# Patient Record
Sex: MALE | Race: WHITE
[De-identification: names, ages, dates, MRNs, and addresses within clinical notes are randomized per-mention and may not be internally consistent; named-entity substitution may affect disease eponyms.]

---

## 2017-05-30 ENCOUNTER — APPOINTMENT (OUTPATIENT)
Dept: DERMATOLOGY | Facility: CLINIC | Age: 22
End: 2017-05-30

## 2017-05-30 VITALS — BODY MASS INDEX: 21.22 KG/M2 | HEIGHT: 68 IN | WEIGHT: 140 LBS

## 2017-05-30 DIAGNOSIS — Z78.9 OTHER SPECIFIED HEALTH STATUS: ICD-10-CM

## 2017-05-30 PROBLEM — Z00.00 ENCOUNTER FOR PREVENTIVE HEALTH EXAMINATION: Status: ACTIVE | Noted: 2017-05-30

## 2017-07-18 ENCOUNTER — APPOINTMENT (OUTPATIENT)
Dept: DERMATOLOGY | Facility: CLINIC | Age: 22
End: 2017-07-18

## 2017-07-18 VITALS — BODY MASS INDEX: 21.22 KG/M2 | HEIGHT: 68 IN | WEIGHT: 140 LBS

## 2017-07-28 ENCOUNTER — APPOINTMENT (OUTPATIENT)
Dept: DERMATOLOGY | Facility: CLINIC | Age: 22
End: 2017-07-28
Payer: COMMERCIAL

## 2017-07-28 DIAGNOSIS — L02.11 CUTANEOUS ABSCESS OF NECK: ICD-10-CM

## 2017-07-28 DIAGNOSIS — B08.1 MOLLUSCUM CONTAGIOSUM: ICD-10-CM

## 2017-07-28 PROCEDURE — 99213 OFFICE O/P EST LOW 20 MIN: CPT | Mod: 25

## 2017-07-28 PROCEDURE — 10060 I&D ABSCESS SIMPLE/SINGLE: CPT

## 2017-07-31 LAB — BACTERIA SPEC CULT: ABNORMAL

## 2017-10-19 ENCOUNTER — APPOINTMENT (OUTPATIENT)
Dept: DERMATOLOGY | Facility: CLINIC | Age: 22
End: 2017-10-19

## 2019-03-26 ENCOUNTER — APPOINTMENT (OUTPATIENT)
Dept: DERMATOLOGY | Facility: CLINIC | Age: 24
End: 2019-03-26

## 2019-12-23 ENCOUNTER — APPOINTMENT (OUTPATIENT)
Dept: DERMATOLOGY | Facility: CLINIC | Age: 24
End: 2019-12-23
Payer: COMMERCIAL

## 2019-12-23 DIAGNOSIS — L73.9 FOLLICULAR DISORDER, UNSPECIFIED: ICD-10-CM

## 2019-12-23 PROCEDURE — 99214 OFFICE O/P EST MOD 30 MIN: CPT

## 2019-12-23 NOTE — PHYSICAL EXAM
[Full Body Skin Exam Performed] : performed [FreeTextEntry3] : Skin examination performed of the face, neck, trunk, arms, legs; \par The patient is well, alert and oriented, pleasant and cooperative.\par Eyelids, conjunctivae, oral mucosa, digits and nails all normal.  \par No cervical adenopathy.\par \par Normal findings include:\par \par regular brown papule;  R upper posterior arm\par acneiform papules in beard area;  L > R cheek, chin\par \par Multiple benign nevi were noted. \par \par No lesions were suspicious for malignancy. \par \par

## 2019-12-23 NOTE — HISTORY OF PRESENT ILLNESS
[de-identified] : Pt. presents for skin check;\par No itching, bleeding, growing, changing lesions noted;\par Severity:  mild  \par Modifying factors:  none\par Associated symptoms:  none\par Context:  no association with activity \par Hx molluscum on neck;  in 2017;  now c/o problems with bumps on beard area; \par also:  mole on R arm;  lifelong;  ? changed recently\par

## 2019-12-23 NOTE — ASSESSMENT
[FreeTextEntry1] : Benign congenital nevus R upper arm;  no suspicious features; \par \par Complete skin examination is negative for malignancy; Pt. goes tanning, discussed at length;  needs annual exams; \par \par shaving folliculitis;  discussed electric razor;  duac gel HS:\par \par Hx molluscum;  resolved

## 2020-08-08 ENCOUNTER — APPOINTMENT (OUTPATIENT)
Dept: DERMATOLOGY | Facility: CLINIC | Age: 25
End: 2020-08-08

## 2020-12-23 ENCOUNTER — APPOINTMENT (OUTPATIENT)
Dept: DERMATOLOGY | Facility: CLINIC | Age: 25
End: 2020-12-23

## 2021-03-23 ENCOUNTER — TRANSCRIPTION ENCOUNTER (OUTPATIENT)
Age: 26
End: 2021-03-23

## 2021-03-27 ENCOUNTER — TRANSCRIPTION ENCOUNTER (OUTPATIENT)
Age: 26
End: 2021-03-27

## 2023-02-03 ENCOUNTER — NON-APPOINTMENT (OUTPATIENT)
Age: 28
End: 2023-02-03

## 2023-03-01 ENCOUNTER — LABORATORY RESULT (OUTPATIENT)
Age: 28
End: 2023-03-01

## 2023-03-01 ENCOUNTER — APPOINTMENT (OUTPATIENT)
Dept: TRANSGENDER CARE | Facility: CLINIC | Age: 28
End: 2023-03-01
Payer: COMMERCIAL

## 2023-03-01 ENCOUNTER — TRANSCRIPTION ENCOUNTER (OUTPATIENT)
Age: 28
End: 2023-03-01

## 2023-03-01 VITALS
SYSTOLIC BLOOD PRESSURE: 152 MMHG | HEART RATE: 78 BPM | TEMPERATURE: 98.7 F | WEIGHT: 178 LBS | DIASTOLIC BLOOD PRESSURE: 98 MMHG | OXYGEN SATURATION: 97 % | BODY MASS INDEX: 26.98 KG/M2 | HEIGHT: 68 IN

## 2023-03-01 DIAGNOSIS — Z11.3 ENCOUNTER FOR SCREENING FOR INFECTIONS WITH A PREDOMINANTLY SEXUAL MODE OF TRANSMISSION: ICD-10-CM

## 2023-03-01 DIAGNOSIS — Z11.59 ENCOUNTER FOR SCREENING FOR OTHER VIRAL DISEASES: ICD-10-CM

## 2023-03-01 DIAGNOSIS — Z11.4 ENCOUNTER FOR SCREENING FOR HUMAN IMMUNODEFICIENCY VIRUS [HIV]: ICD-10-CM

## 2023-03-01 PROCEDURE — 99214 OFFICE O/P EST MOD 30 MIN: CPT | Mod: 25

## 2023-03-01 RX ORDER — IMIQUIMOD 50 MG/G
5 CREAM TOPICAL
Qty: 1 | Refills: 2 | Status: COMPLETED | COMMUNITY
Start: 2017-05-30 | End: 2023-03-01

## 2023-03-01 RX ORDER — CLINDAMYCIN PHOSPHATE AND BENZOYL PEROXIDE 10; 50 MG/G; MG/G
1.2-5 GEL TOPICAL
Qty: 1 | Refills: 3 | Status: COMPLETED | COMMUNITY
Start: 2019-12-23 | End: 2023-03-01

## 2023-03-01 RX ORDER — CEFADROXIL 500 MG/1
500 CAPSULE ORAL TWICE DAILY
Qty: 20 | Refills: 0 | Status: COMPLETED | COMMUNITY
Start: 2017-07-28 | End: 2023-03-01

## 2023-03-01 NOTE — PHYSICAL EXAM
[Normal Sclera/Conjunctiva] : normal sclera/conjunctiva [Normal TMs] : both tympanic membranes were normal [No Edema] : there was no peripheral edema [No Extremity Clubbing/Cyanosis] : no extremity clubbing/cyanosis [Soft] : abdomen soft [Non Tender] : non-tender [Non-distended] : non-distended [No Masses] : no abdominal mass palpated [Normal Bowel Sounds] : normal bowel sounds [Normal] : no rash [Normal Gait] : normal gait

## 2023-03-03 ENCOUNTER — TRANSCRIPTION ENCOUNTER (OUTPATIENT)
Age: 28
End: 2023-03-03

## 2023-03-03 LAB
25(OH)D3 SERPL-MCNC: 29.9 NG/ML
ALBUMIN SERPL ELPH-MCNC: 5.3 G/DL
ALP BLD-CCNC: 67 U/L
ALT SERPL-CCNC: 27 U/L
ANION GAP SERPL CALC-SCNC: 13 MMOL/L
APPEARANCE: CLEAR
AST SERPL-CCNC: 22 U/L
BASOPHILS # BLD AUTO: 0.02 K/UL
BASOPHILS NFR BLD AUTO: 0.4 %
BILIRUB SERPL-MCNC: 0.4 MG/DL
BILIRUBIN URINE: NEGATIVE
BLOOD URINE: NEGATIVE
BUN SERPL-MCNC: 14 MG/DL
C TRACH RRNA SPEC QL NAA+PROBE: NOT DETECTED
CALCIUM SERPL-MCNC: 10.4 MG/DL
CHLORIDE SERPL-SCNC: 103 MMOL/L
CHOLEST SERPL-MCNC: 222 MG/DL
CO2 SERPL-SCNC: 26 MMOL/L
COLOR: YELLOW
CREAT SERPL-MCNC: 0.96 MG/DL
EGFR: 112 ML/MIN/1.73M2
EOSINOPHIL # BLD AUTO: 0.04 K/UL
EOSINOPHIL NFR BLD AUTO: 0.8 %
ESTIMATED AVERAGE GLUCOSE: 91 MG/DL
ESTRADIOL SERPL-MCNC: 25 PG/ML
FSH SERPL-MCNC: 4 IU/L
GLUCOSE QUALITATIVE U: NEGATIVE
GLUCOSE SERPL-MCNC: 100 MG/DL
HBA1C MFR BLD HPLC: 4.8 %
HBV CORE IGG+IGM SER QL: NONREACTIVE
HBV SURFACE AB SERPL IA-ACNC: 85 MIU/ML
HBV SURFACE AG SER QL: NONREACTIVE
HCT VFR BLD CALC: 51.2 %
HCV AB SER QL: NONREACTIVE
HCV S/CO RATIO: 0.09 S/CO
HDLC SERPL-MCNC: 60 MG/DL
HEPATITIS A IGG ANTIBODY: NONREACTIVE
HGB BLD-MCNC: 17.2 G/DL
HIV1 RNA # SERPL NAA+PROBE: NORMAL
HIV1 RNA # SERPL NAA+PROBE: NORMAL COPIES/ML
HIV1+2 AB SPEC QL IA.RAPID: NONREACTIVE
IMM GRANULOCYTES NFR BLD AUTO: 0.2 %
KETONES URINE: NEGATIVE
LDLC SERPL CALC-MCNC: 130 MG/DL
LEUKOCYTE ESTERASE URINE: NEGATIVE
LH SERPL-ACNC: 3.4 IU/L
LYMPHOCYTES # BLD AUTO: 1.39 K/UL
LYMPHOCYTES NFR BLD AUTO: 28.1 %
MAN DIFF?: NORMAL
MCHC RBC-ENTMCNC: 31 PG
MCHC RBC-ENTMCNC: 33.6 GM/DL
MCV RBC AUTO: 92.4 FL
MONOCYTES # BLD AUTO: 0.33 K/UL
MONOCYTES NFR BLD AUTO: 6.7 %
N GONORRHOEA RRNA SPEC QL NAA+PROBE: NOT DETECTED
NEUTROPHILS # BLD AUTO: 3.15 K/UL
NEUTROPHILS NFR BLD AUTO: 63.8 %
NITRITE URINE: NEGATIVE
NONHDLC SERPL-MCNC: 161 MG/DL
PH URINE: 7
PLATELET # BLD AUTO: 249 K/UL
POTASSIUM SERPL-SCNC: 4.7 MMOL/L
PROT SERPL-MCNC: 8.2 G/DL
PROTEIN URINE: ABNORMAL
RBC # BLD: 5.54 M/UL
RBC # FLD: 13.1 %
SODIUM SERPL-SCNC: 142 MMOL/L
SOURCE AMPLIFICATION: NORMAL
SOURCE ANAL: NORMAL
SOURCE ORAL: NORMAL
SPECIFIC GRAVITY URINE: 1.03
T PALLIDUM AB SER QL IA: NEGATIVE
TESTOST SERPL-MCNC: 300 NG/DL
TRIGL SERPL-MCNC: 156 MG/DL
TSH SERPL-ACNC: 2.05 UIU/ML
UROBILINOGEN URINE: NORMAL
VIRAL LOAD INTERP: NORMAL
VIRAL LOAD LOG: NORMAL LG COP/ML
WBC # FLD AUTO: 4.94 K/UL

## 2023-03-08 ENCOUNTER — APPOINTMENT (OUTPATIENT)
Dept: TRANSGENDER CARE | Facility: CLINIC | Age: 28
End: 2023-03-08
Payer: COMMERCIAL

## 2023-03-08 VITALS
TEMPERATURE: 98.5 F | HEART RATE: 76 BPM | SYSTOLIC BLOOD PRESSURE: 138 MMHG | HEIGHT: 68 IN | BODY MASS INDEX: 27.43 KG/M2 | DIASTOLIC BLOOD PRESSURE: 90 MMHG | OXYGEN SATURATION: 97 % | WEIGHT: 181 LBS

## 2023-03-08 DIAGNOSIS — E78.00 PURE HYPERCHOLESTEROLEMIA, UNSPECIFIED: ICD-10-CM

## 2023-03-08 PROCEDURE — 99214 OFFICE O/P EST MOD 30 MIN: CPT

## 2023-03-08 NOTE — HISTORY OF PRESENT ILLNESS
[FreeTextEntry1] : f/u [de-identified] : Patient reports doing well since last visit, was recently cleared by MH provider. Patient reports he did not find sperm banking resources in his folder, but would like to pursue this prior to starting hormones. He reports most interested in starting estradiol tabs and navi.\par \par Patient reports he is ready to start hormones, but just concerned with being in school full time etc. does not want to take on too much at once. Reports this has been a long time coming and very ready to start. He has consult with Dr. Jolly later this month, patient looking to start laser hair removal.\par \par He reports at last visit he was dehydrated and had not drank much. Patient reports he was fasting for 18 hours prior to labs. He reports that his father has elevated cholesterol, runs in the family. He received message on [facility] and is attempting to make lifestyle modifications.\par \par Pt denies any SOB, cough, chest pain, palpitations, N/V/D/C, fever, or chills. No other health concerns today.

## 2023-03-08 NOTE — PHYSICAL EXAM
[No Respiratory Distress] : no respiratory distress  [Normal Gait] : normal gait [Normal] : affect was normal and insight and judgment were intact

## 2023-03-08 NOTE — PLAN
[FreeTextEntry1] : \par Patient dehydrated at time of visit, repeat CBC at next blood draw once hydrated. Patient verbalized understanding. \par \par Elevated cholesterol: No treatment required at this time - recommend dietary modifications and continue to monitor. Advised patient to decrease sugars/carbs/fat intake specifically saturated and trans fats (fried food, fast food, cheeses, processed foods). Educated to increase polyunsaturated and monounsaturated fats (olive oil, fish, avocado, Mediterranean diet foods) to help increase HDL. Advised patient that weight loss (diet and exercise) can help impact lipid levels positively. Patient verbalized understanding and has no further questions. \par \par Gender Dysphoria: long discussion with patient, gave resources for sperm banking. Patient will call after sperm banking and I will send in rx for hormones to pharmacy on chart to start GAHT - will call if he changes his mind on sperm banking. Patient advised can also start hormones and stop if chooses to do so. F/u 3 months after starting GAHT. Advised patient to call with any questions or concerns. Patient verbalized understanding. \par \par \par \par Patient wishes to start gender-affirming Feminization therapy.  \par \par Discussed with patient that the feminizing changes of Estrogen and Spironolactone may take several months to become noticeable and usually take 3 to 5 years to reach full effect.  \par \par Discussed permanent changes which include breast growth and development, testicular atrophy, and infertility.\par \par Discussed non-permanent changes including loss of muscle mass, decreased muscle strength, weight gain with redistribution of fat to buttocks and thighs, facial/body hair softening and lightening (current facial and body hair will not go away), male pattern baldness may slow or stop, but not go away, reduced sex drive, decreased strength or erections or cessation of erections, decrease in volume and viscosity of ejaculate, changes in mood including increased emotional responses and tearfulness.\par \par Discussed risks and possible side effects, including loss of fertility, increased urinary frequency, possible increased risk of kidney damage, increase risk of blood clots, strokes, and heart attacks, possible increase in cholesterol and blood pressure leading to increased risk for cardiovascular disease, possible increase in risk of developing diabetes, increased appetite and weight gain, possible liver damage, possible worsening of headaches or migraines, galactorrhea and increased prolactin levels, increased risk of prolactinomas, possible worsening of depression or mood swings, and possible increased risk of breast cancer.\par \par Patient understands that smoking cigarettes may increase some of the risks of taking estrogen, and that taking doses higher than recommended will increase the risks of feminizing treatment. Patient understands that feminizing hormone therapy is expected to be lifelong and that suddenly stopping it after a long time may have negative health effects. Patient understands they may choose to stop therapy at any time and for any reason, but they are encouraged to discuss this decision with a healthcare provider prior to doing this. Patient understands that some medical reasons and/or safety concerns may require decrease in dosage or cessation of therapy at providers discretion.  \par \par Cryopreservation/fertility options were discussed with patient.\par \par Patient voices understanding of our discussion today. Questions and concerns have been addressed. Patient consents to initiation of treatment. \par \par \par Baseline labs reviewed.  \par \par Start Estradiol 2 mg bid and Spironolactone 50 mg bid, with goal estradiol level 100-200 and goal testosterone level <55  \par \par Repeat levels in 3 months, and titrate dose as appropriate.\par \par Patient will need age appropriate prostate cancer screening and will need age appropriate screening mammogram after 5 years of therapy. \par \par \par \par \par \par Advised patient to call with any questions or concerns. Patient verbalized understanding.

## 2023-03-10 ENCOUNTER — TRANSCRIPTION ENCOUNTER (OUTPATIENT)
Age: 28
End: 2023-03-10

## 2023-03-12 ENCOUNTER — TRANSCRIPTION ENCOUNTER (OUTPATIENT)
Age: 28
End: 2023-03-12

## 2023-03-14 ENCOUNTER — TRANSCRIPTION ENCOUNTER (OUTPATIENT)
Age: 28
End: 2023-03-14

## 2023-03-14 ENCOUNTER — NON-APPOINTMENT (OUTPATIENT)
Age: 28
End: 2023-03-14

## 2023-03-20 ENCOUNTER — APPOINTMENT (OUTPATIENT)
Dept: OTOLARYNGOLOGY | Facility: CLINIC | Age: 28
End: 2023-03-20

## 2023-03-23 ENCOUNTER — APPOINTMENT (OUTPATIENT)
Dept: PLASTIC SURGERY | Facility: CLINIC | Age: 28
End: 2023-03-23
Payer: COMMERCIAL

## 2023-03-23 DIAGNOSIS — F64.9 GENDER IDENTITY DISORDER, UNSPECIFIED: ICD-10-CM

## 2023-03-23 PROCEDURE — 99203 OFFICE O/P NEW LOW 30 MIN: CPT

## 2023-03-25 PROBLEM — F64.9 GENDER DYSPHORIA: Status: ACTIVE | Noted: 2023-03-01

## 2023-03-25 NOTE — HISTORY OF PRESENT ILLNESS
[FreeTextEntry1] : 27 years old patient presents in the office for a hair laser removal of face, chest, abdomen and back consultation\sanjuana Lea is a transgender male to female patient in the early stages of transitioning who has been diagnosed with gender dysphoria.  The patient reports that the body and facial hair but most specifically the facial and neck hair cause and exacerbate feelings of gender dysphoria.  The patient reports shaving twice daily due to rapid hair growth and feelings of dysphoria.  \par Patient denies previous gender affirming surgeries and procedures\par No contraindicating medical history to laser treatment\par No significant past medical or surgical history\par No pertinent family medical history

## 2023-04-06 ENCOUNTER — APPOINTMENT (OUTPATIENT)
Dept: DERMATOLOGY | Facility: CLINIC | Age: 28
End: 2023-04-06
Payer: COMMERCIAL

## 2023-04-06 DIAGNOSIS — L81.4 OTHER MELANIN HYPERPIGMENTATION: ICD-10-CM

## 2023-04-06 DIAGNOSIS — D22.61 MELANOCYTIC NEVI OF RIGHT UPPER LIMB, INCLUDING SHOULDER: ICD-10-CM

## 2023-04-06 DIAGNOSIS — L72.3 SEBACEOUS CYST: ICD-10-CM

## 2023-04-06 PROCEDURE — 10060 I&D ABSCESS SIMPLE/SINGLE: CPT

## 2023-04-06 PROCEDURE — 99203 OFFICE O/P NEW LOW 30 MIN: CPT | Mod: 25

## 2023-04-06 NOTE — HISTORY OF PRESENT ILLNESS
[de-identified] : Pt. c/o lesion on left cheek;  aggravated by shaving, grew recently;\par also:  hx tanning bed use;  c/o mole on Right arm

## 2023-04-06 NOTE — PHYSICAL EXAM
[FreeTextEntry3] : Left lateral cheek;  inflamed double comedone; \par \par R posterior upper arm; \par regular brown nevus with terminal hair

## 2023-04-13 ENCOUNTER — APPOINTMENT (OUTPATIENT)
Dept: PLASTIC SURGERY | Facility: CLINIC | Age: 28
End: 2023-04-13

## 2023-05-01 ENCOUNTER — NON-APPOINTMENT (OUTPATIENT)
Age: 28
End: 2023-05-01

## 2023-06-04 ENCOUNTER — NON-APPOINTMENT (OUTPATIENT)
Age: 28
End: 2023-06-04

## 2023-06-13 ENCOUNTER — NON-APPOINTMENT (OUTPATIENT)
Age: 28
End: 2023-06-13

## 2023-06-30 NOTE — ASSESSMENT
[FreeTextEntry1] : \par The [employer] Safe Sex Evidence Based Intervention was applied and a [positive reinforcement of safe behavior message ] was provided.

## 2023-06-30 NOTE — PLAN
[FreeTextEntry1] : \par Gender Dysphoria: Extensive gender dysphoria discussion with patient at time of visit. Patient will obtain MH clearance letter from therapist. Will order routine blood work and f/u results to patient once made available. GAHT to be started pending normal labs,  clearance letter and consent. Patient was provided with resources/referrals at time of visit. Advised patient to call with any questions or concerns. Patient verbalized understanding. \par \par STI Screen \par - Counseled on safe sex practices, risk reduction and condom use. Condoms provided to patient. \par - Aware of indications for both PrEP and nPEP. nPEP must be taken with in the first 72 hours. Must go to nearest ER. \par - Labs done today for HIV, Hepatitis, Syphilis, GC/ CT. Will call pt back with results \par - Triple Site done today\par - Advised patient to call with any questions or concerns. Patient verbalized understanding.

## 2023-06-30 NOTE — HISTORY OF PRESENT ILLNESS
[FreeTextEntry1] : establish care [de-identified] : VENU JO is a 27 year old, gender questioning person seen on 03/01/2023 for initial transgender care program intake visit.\par \par Preferred Pronouns: he/him\par Gender identity: male\par Assigned []male at birth\par Specific Terms for Body Parts: medical terms okay\par Call pt: Venu\par \par  \par Venu is a 27-year-old gender questioning person, he/him pronouns for now, he is interested in learning more about gender transition as he experiences dysphoria and is possibly interested in starting GHT. Possibly interested in transferring primary care, would like to have safe space while transitioning\par \par COVID Screening: \par \par Vaccinated, 2 shots. No MPX, no interest.\par \par Presenting Problems or Unmet Needs: \par \par Denies any.\par \par “Goals” \par \par Establish PCP\par Start GHT\par Laser hair removal resources\par \par Transition HX + Present Life: \par \par Jarvis relays he has been questioning his gender identity since 14 years old, he started understanding it more about 3 years ago, relays he has suppressed and tried to act like isn’t there for many years, and felt fearful about researching more about trans people for a while. Is out to parents and close friends as questioning, relays parents are very supportive. Patient reports his therapist has been helping him understand what he is going through, has seen him since end of aug. Patient reports opening up to parents open minded and supportive. He feels close minded about himself but open for everyone else. He feels different when hes out with people but at home looking in mirror, shaving twice a day will miss school/work etc. He reports a lot of masculine features hard time accepting who he feels inside himself. Patient reports a lot of body dysmorphia with body image. He reports he started experimenting with drag, makeup to look more feminine. He realized it wasn’t just drag this is how he feels, wants to present feminine/fem features. He feels like his true self has been suppressed and wants to come out patient working on acceptance\par \par Education | Employment: \par \par Works for  in [employer] and attends [employer], relays work and school are supportive. Patient loves basic chemistry, likes organic chem too - he wants to teach hs chemistry. Patient likes things he is good at, 1 year of undergrad and 1 year masters left\par \par Mental Health: \par \par Denies any stressors, sees therapist Domingo Hernandez weekly. No psych meds, no family psych hx, no inpatient, no hx of violence. He denies any thoughts of hurting self or anyone else\par \par Endocrinology, Primary Care, GYN: \par \par PCP is Dr. Domingo Colon in [facility] 5 years ago. No hx of endo disorders.\par \par Coping: \par \par Likes being out, being active, working, and driving. Patient is a scorpio, sag rising, may moon (relates to this, feels like a may)\par \par Feelings of Gender Dysphoria/ Body Dysmorphia: \par \par When out he feels like this other person and when he comes home and looks in mirrors, he feels a disconnect to body and self, which can cause identity crisis. Relays facial hair causes significant distress, would like laser hair removal resources.\par \par Tattoos/ Piercing: \par \par All professionally done.\par \par Cigarette, Vaping, Marijuana, Alcohol, and Drug Use: \par \par Drinks alcohol about once a week. Denies any nicotine, marijuana, or drug use.\par \par Reproductive Endocrinology: \par \par Would like resources for sperm banking. Patient reports he is interested in biological children\par \par Gender Affirming Surgery: \par \par Interested in FFS, body contouring, and bottom surgery in the future.\par \par Name Change + Gender Marker: \par \par Would like resources - Amelia\par \par Nutrition: \par \par No concerns, eats 3 meals a day, exercises by going to gym twice a week. 5’8” 155lbs\par \par Sexual Health: \par \par Not in a relationship, last sexually active 2 weeks ago, 2 partners in the last 3 months. No hx of STIs/HIV. Patient would like STI testing, had gonorrhea recently (treated). He reports he has not been super sexually active\par \par \par Goals of Trans care:\par \par 1) GAHT\par 2) referrals/resources\par 3) PCP\par \par \par Patient reports he has been relatively healthy over the years no major concerns. Pt denies any SOB, cough, chest pain, palpitations, N/V/D/C, fever, or chills. No other health concerns today.

## 2023-06-30 NOTE — HEALTH RISK ASSESSMENT
[Yes] : Yes [Monthly or less (1 pt)] : Monthly or less (1 point) [1 or 2 (0 pts)] : 1 or 2 (0 points) [Never (0 pts)] : Never (0 points) [No] : In the past 12 months have you used drugs other than those required for medical reasons? No [0] : 2) Feeling down, depressed, or hopeless: Not at all (0) [PHQ-2 Negative - No further assessment needed] : PHQ-2 Negative - No further assessment needed [HIV Test offered] : HIV Test offered [Hepatitis C test offered] : Hepatitis C test offered [Employed] : employed [Student] : student [Single] : single [Sexually Active] : sexually active [Feels Safe at Home] : Feels safe at home [Fully functional (bathing, dressing, toileting, transferring, walking, feeding)] : Fully functional (bathing, dressing, toileting, transferring, walking, feeding) [Fully functional (using the telephone, shopping, preparing meals, housekeeping, doing laundry, using] : Fully functional and needs no help or supervision to perform IADLs (using the telephone, shopping, preparing meals, housekeeping, doing laundry, using transportation, managing medications and managing finances) [Audit-CScore] : 1 [de-identified] : goes to gym [de-identified] : regular [[id number]] : 0 [FreeTextEntry2] :  at [employer], in school to become chemistry hs teacher

## 2024-01-04 ENCOUNTER — NON-APPOINTMENT (OUTPATIENT)
Age: 29
End: 2024-01-04

## 2024-04-11 ENCOUNTER — APPOINTMENT (OUTPATIENT)
Dept: DERMATOLOGY | Facility: CLINIC | Age: 29
End: 2024-04-11

## 2024-05-28 ENCOUNTER — NON-APPOINTMENT (OUTPATIENT)
Age: 29
End: 2024-05-28

## 2024-07-16 ENCOUNTER — NON-APPOINTMENT (OUTPATIENT)
Age: 29
End: 2024-07-16

## 2024-07-30 ENCOUNTER — APPOINTMENT (OUTPATIENT)
Dept: DERMATOLOGY | Facility: CLINIC | Age: 29
End: 2024-07-30

## 2024-08-05 ENCOUNTER — NON-APPOINTMENT (OUTPATIENT)
Age: 29
End: 2024-08-05

## 2024-08-08 ENCOUNTER — NON-APPOINTMENT (OUTPATIENT)
Age: 29
End: 2024-08-08